# Patient Record
Sex: MALE | Race: NATIVE HAWAIIAN OR OTHER PACIFIC ISLANDER | URBAN - METROPOLITAN AREA
[De-identification: names, ages, dates, MRNs, and addresses within clinical notes are randomized per-mention and may not be internally consistent; named-entity substitution may affect disease eponyms.]

---

## 2017-08-30 ENCOUNTER — IMPORTED ENCOUNTER (OUTPATIENT)
Dept: URBAN - METROPOLITAN AREA CLINIC 50 | Facility: CLINIC | Age: 77
End: 2017-08-30

## 2017-09-05 ENCOUNTER — IMPORTED ENCOUNTER (OUTPATIENT)
Dept: URBAN - METROPOLITAN AREA CLINIC 50 | Facility: CLINIC | Age: 77
End: 2017-09-05

## 2018-03-06 ENCOUNTER — IMPORTED ENCOUNTER (OUTPATIENT)
Dept: URBAN - METROPOLITAN AREA CLINIC 50 | Facility: CLINIC | Age: 78
End: 2018-03-06

## 2018-09-04 ENCOUNTER — IMPORTED ENCOUNTER (OUTPATIENT)
Dept: URBAN - METROPOLITAN AREA CLINIC 50 | Facility: CLINIC | Age: 78
End: 2018-09-04

## 2019-03-05 ENCOUNTER — IMPORTED ENCOUNTER (OUTPATIENT)
Dept: URBAN - METROPOLITAN AREA CLINIC 50 | Facility: CLINIC | Age: 79
End: 2019-03-05

## 2019-09-24 ENCOUNTER — IMPORTED ENCOUNTER (OUTPATIENT)
Dept: URBAN - METROPOLITAN AREA CLINIC 50 | Facility: CLINIC | Age: 79
End: 2019-09-24

## 2021-04-18 ASSESSMENT — VISUAL ACUITY
OD_SC: 20/40
OS_SC: 20/25+2
OS_CC: J1
OD_SC: 20/30+2
OS_SC: 20/20-1
OS_SC: 20/25-
OD_CC: J1+
OS_CC: J1
OD_CC: J1
OD_PH: 20/25
OD_SC: 20/30
OS_SC: 20/25
OS_SC: 20/20
OS_CC: J1+
OD_CC: J1
OD_PH: 20/25-
OD_SC: 20/40
OD_SC: 20/25-1

## 2021-04-18 ASSESSMENT — TONOMETRY
OD_IOP_MMHG: 15
OS_IOP_MMHG: 16
OS_IOP_MMHG: 17
OS_IOP_MMHG: 16
OD_IOP_MMHG: 16
OD_IOP_MMHG: 17
OS_IOP_MMHG: 16
OS_IOP_MMHG: 15
OD_IOP_MMHG: 18
OS_IOP_MMHG: 15
OD_IOP_MMHG: 14
OS_IOP_MMHG: 17
OS_IOP_MMHG: 16
OS_IOP_MMHG: 17
OS_IOP_MMHG: 18

## 2021-04-18 ASSESSMENT — PACHYMETRY
OD_CT_UM: 541
OS_CT_UM: 534
OD_CT_UM: 541
OD_CT_UM: 541
OS_CT_UM: 534
OS_CT_UM: 534
OD_CT_UM: 541
OS_CT_UM: 534
OD_CT_UM: 541
OS_CT_UM: 534
OD_CT_UM: 541
OS_CT_UM: 534

## 2021-06-09 ENCOUNTER — PREPPED CHART (OUTPATIENT)
Dept: URBAN - METROPOLITAN AREA CLINIC 49 | Facility: CLINIC | Age: 81
End: 2021-06-09

## 2021-07-30 ENCOUNTER — APPOINTMENT (OUTPATIENT)
Dept: CT IMAGING | Facility: HOSPITAL | Age: 81
End: 2021-07-30
Payer: MEDICARE

## 2021-07-30 ENCOUNTER — HOSPITAL ENCOUNTER (EMERGENCY)
Facility: HOSPITAL | Age: 81
Discharge: HOME | End: 2021-07-30
Attending: FAMILY MEDICINE
Payer: MEDICARE

## 2021-07-30 ENCOUNTER — APPOINTMENT (OUTPATIENT)
Dept: RADIOLOGY | Facility: HOSPITAL | Age: 81
End: 2021-07-30
Payer: MEDICARE

## 2021-07-30 VITALS
RESPIRATION RATE: 24 BRPM | DIASTOLIC BLOOD PRESSURE: 65 MMHG | SYSTOLIC BLOOD PRESSURE: 138 MMHG | HEIGHT: 64 IN | OXYGEN SATURATION: 94 % | BODY MASS INDEX: 31.41 KG/M2 | WEIGHT: 184 LBS | HEART RATE: 67 BPM | TEMPERATURE: 98.1 F

## 2021-07-30 DIAGNOSIS — G45.9 TIA (TRANSIENT ISCHEMIC ATTACK): ICD-10-CM

## 2021-07-30 DIAGNOSIS — R51.9 HEADACHE: Primary | ICD-10-CM

## 2021-07-30 DIAGNOSIS — I16.0 HYPERTENSIVE URGENCY: ICD-10-CM

## 2021-07-30 LAB
ALBUMIN SERPL-MCNC: 4.3 G/DL (ref 3.5–5.3)
ALP SERPL-CCNC: 69 U/L (ref 45–115)
ALT SERPL-CCNC: 20 U/L (ref 7–52)
ANION GAP SERPL CALC-SCNC: 9 MMOL/L (ref 3–11)
AST SERPL-CCNC: 20 U/L
BASOPHILS # BLD AUTO: 0.1 10*3/UL
BASOPHILS NFR BLD AUTO: 1 % (ref 0–2)
BILIRUB SERPL-MCNC: 0.67 MG/DL (ref 0.2–1.4)
BUN SERPL-MCNC: 15 MG/DL (ref 7–25)
CALCIUM ALBUM COR SERPL-MCNC: 9.1 MG/DL (ref 8.6–10.3)
CALCIUM SERPL-MCNC: 9.3 MG/DL (ref 8.6–10.3)
CHLORIDE SERPL-SCNC: 105 MMOL/L (ref 98–107)
CO2 SERPL-SCNC: 21 MMOL/L (ref 21–32)
CREAT SERPL-MCNC: 0.81 MG/DL (ref 0.7–1.3)
EOSINOPHIL # BLD AUTO: 0.1 10*3/UL
EOSINOPHIL NFR BLD AUTO: 1 % (ref 0–3)
ERYTHROCYTE [DISTWIDTH] IN BLOOD BY AUTOMATED COUNT: 14.8 % (ref 11.5–15)
GFR SERPL CREATININE-BSD FRML MDRD: 85 ML/MIN/1.73M*2
GLUCOSE SERPL-MCNC: 101 MG/DL (ref 70–105)
HCT VFR BLD AUTO: 44.6 % (ref 38–50)
HGB BLD-MCNC: 15.4 G/DL (ref 13.2–17.2)
LEVETIRACETAM SERPL-MCNC: 19.4 UG/ML (ref 6–46)
LYMPHOCYTES # BLD AUTO: 1.7 10*3/UL
LYMPHOCYTES NFR BLD AUTO: 24 % (ref 15–47)
MAGNESIUM SERPL-MCNC: 2.1 MG/DL (ref 1.8–2.4)
MCH RBC QN AUTO: 32.3 PG (ref 29–34)
MCHC RBC AUTO-ENTMCNC: 34.6 G/DL (ref 32–36)
MCV RBC AUTO: 93.3 FL (ref 82–97)
MONOCYTES # BLD AUTO: 0.7 10*3/UL
MONOCYTES NFR BLD AUTO: 10 % (ref 5–13)
NEUTROPHILS # BLD AUTO: 4.7 10*3/UL
NEUTROPHILS NFR BLD AUTO: 65 % (ref 46–70)
PLATELET # BLD AUTO: 185 10*3/UL (ref 130–350)
PMV BLD AUTO: 8.8 FL (ref 6.9–10.8)
POTASSIUM SERPL-SCNC: 4.2 MMOL/L (ref 3.5–5.1)
PROT SERPL-MCNC: 6.9 G/DL (ref 6–8.3)
RBC # BLD AUTO: 4.78 10*6/ΜL (ref 4.1–5.8)
SODIUM SERPL-SCNC: 135 MMOL/L (ref 135–145)
WBC # BLD AUTO: 7.3 10*3/UL (ref 3.7–9.6)

## 2021-07-30 PROCEDURE — 85025 COMPLETE CBC W/AUTO DIFF WBC: CPT | Performed by: FAMILY MEDICINE

## 2021-07-30 PROCEDURE — 36415 COLL VENOUS BLD VENIPUNCTURE: CPT | Performed by: FAMILY MEDICINE

## 2021-07-30 PROCEDURE — 70450 CT HEAD/BRAIN W/O DYE: CPT | Mod: 26,MG | Performed by: RADIOLOGY

## 2021-07-30 PROCEDURE — 6370000100 HC RX 637 (ALT 250 FOR IP): Performed by: FAMILY MEDICINE

## 2021-07-30 PROCEDURE — 99284 EMERGENCY DEPT VISIT MOD MDM: CPT | Performed by: FAMILY MEDICINE

## 2021-07-30 PROCEDURE — 80053 COMPREHEN METABOLIC PANEL: CPT | Performed by: FAMILY MEDICINE

## 2021-07-30 PROCEDURE — 6360000200 HC RX 636 W HCPCS (ALT 250 FOR IP): Performed by: FAMILY MEDICINE

## 2021-07-30 PROCEDURE — 83735 ASSAY OF MAGNESIUM: CPT | Performed by: FAMILY MEDICINE

## 2021-07-30 PROCEDURE — 71046 X-RAY EXAM CHEST 2 VIEWS: CPT

## 2021-07-30 PROCEDURE — 96375 TX/PRO/DX INJ NEW DRUG ADDON: CPT

## 2021-07-30 PROCEDURE — 96374 THER/PROPH/DIAG INJ IV PUSH: CPT

## 2021-07-30 PROCEDURE — G1004 CDSM NDSC: HCPCS | Performed by: RADIOLOGY

## 2021-07-30 PROCEDURE — 80177 DRUG SCRN QUAN LEVETIRACETAM: CPT | Performed by: FAMILY MEDICINE

## 2021-07-30 PROCEDURE — 71046 X-RAY EXAM CHEST 2 VIEWS: CPT | Mod: 26,CMS | Performed by: RADIOLOGY

## 2021-07-30 PROCEDURE — G1004 CDSM NDSC: HCPCS

## 2021-07-30 RX ORDER — ATORVASTATIN CALCIUM 40 MG/1
40 TABLET, FILM COATED ORAL EVERY OTHER DAY
COMMUNITY

## 2021-07-30 RX ORDER — CLOPIDOGREL BISULFATE 75 MG/1
75 TABLET ORAL DAILY
COMMUNITY

## 2021-07-30 RX ORDER — AMLODIPINE BESYLATE 5 MG/1
5 TABLET ORAL DAILY
COMMUNITY

## 2021-07-30 RX ORDER — SODIUM CHLORIDE 9 MG/ML
25-50 INJECTION, SOLUTION INTRAVENOUS AS NEEDED
Status: DISCONTINUED | OUTPATIENT
Start: 2021-07-30 | End: 2021-07-30 | Stop reason: HOSPADM

## 2021-07-30 RX ORDER — METOPROLOL SUCCINATE 50 MG/1
75 TABLET, EXTENDED RELEASE ORAL NIGHTLY
COMMUNITY

## 2021-07-30 RX ORDER — HYDRALAZINE HYDROCHLORIDE 20 MG/ML
10 INJECTION INTRAMUSCULAR; INTRAVENOUS ONCE
Status: COMPLETED | OUTPATIENT
Start: 2021-07-30 | End: 2021-07-30

## 2021-07-30 RX ORDER — ESCITALOPRAM OXALATE 5 MG/1
10 TABLET ORAL EVERY MORNING
COMMUNITY

## 2021-07-30 RX ORDER — LEVETIRACETAM 500 MG/1
500 TABLET ORAL 2 TIMES DAILY
COMMUNITY

## 2021-07-30 RX ORDER — PANTOPRAZOLE SODIUM 40 MG/1
40 TABLET, DELAYED RELEASE ORAL DAILY
COMMUNITY

## 2021-07-30 RX ORDER — AMLODIPINE BESYLATE 5 MG/1
5 TABLET ORAL ONCE
Status: COMPLETED | OUTPATIENT
Start: 2021-07-30 | End: 2021-07-30

## 2021-07-30 RX ORDER — METOPROLOL SUCCINATE 50 MG/1
50 TABLET, EXTENDED RELEASE ORAL DAILY
COMMUNITY

## 2021-07-30 RX ORDER — ONDANSETRON HYDROCHLORIDE 2 MG/ML
4 INJECTION, SOLUTION INTRAVENOUS ONCE
Status: COMPLETED | OUTPATIENT
Start: 2021-07-30 | End: 2021-07-30

## 2021-07-30 RX ADMIN — AMLODIPINE BESYLATE 5 MG: 5 TABLET ORAL at 18:46

## 2021-07-30 RX ADMIN — HYDRALAZINE HYDROCHLORIDE 10 MG: 20 INJECTION INTRAMUSCULAR; INTRAVENOUS at 17:16

## 2021-07-30 RX ADMIN — ONDANSETRON 4 MG: 2 INJECTION INTRAMUSCULAR; INTRAVENOUS at 18:20

## 2021-07-30 ASSESSMENT — ENCOUNTER SYMPTOMS
SHORTNESS OF BREATH: 0
COLOR CHANGE: 0
SEIZURES: 0
HEADACHES: 1
CHILLS: 0
ARTHRALGIAS: 0
HEMATURIA: 0
ABDOMINAL PAIN: 0
PALPITATIONS: 0
VOMITING: 0
FEVER: 0
BACK PAIN: 0
SORE THROAT: 0
DYSURIA: 0
EYE PAIN: 0
COUGH: 0

## 2021-07-31 NOTE — ED PROVIDER NOTES
"    HPI:  Chief Complaint   Patient presents with   • Headache     Was reading this afternoon and the words on the page seemed to be jumbled and he could not understand them.  Also started to have a headache this afternoon and has had some increased BP lately.   • Blurred Vision       81-year-old male presents with family for an episode of confusion and blurred vision.  Is also complaining of a headache.  He was reading the paper and stated that the \"words were blurry\".  He also had an episode where he was unable to talk or explain himself.  He is complaining of a headache that he rates an 8 out of 10.  He does have a history of hypertension.  His wife states that his blood pressure sometimes \"goes haywire\".  He denies any injury or trauma.  He denies any fevers or chills.      History provided by:  Patient   used: No    Headache  Pain location:  Generalized  Quality:  Dull  Radiates to:  Does not radiate  Severity currently:  8/10  Severity at highest:  8/10  Onset quality:  Gradual  Duration:  3 hours  Timing:  Constant  Progression:  Waxing and waning  Chronicity:  New  Relieved by:  Nothing  Worsened by:  Nothing  Ineffective treatments:  None tried  Associated symptoms: no abdominal pain, no back pain, no cough, no ear pain, no eye pain, no fever, no seizures, no sore throat and no vomiting    Blurred Vision  Associated symptoms: headaches    Associated symptoms: no abdominal pain, no chest pain, no cough, no ear pain, no fever, no rash, no shortness of breath, no sore throat and no vomiting        HISTORY:  Past Medical History:   Diagnosis Date   • Coronary artery disease    • Depression    • Hypercholesteremia    • Hypertension    • Pancreatitis    • Seizure (CMS/HCC) ([facility])        Past Surgical History:   Procedure Laterality Date   • AORTIC VALVE REPLACEMENT  10/01/2020   • CARDIAC SURGERY     • CHOLECYSTECTOMY     • CORONARY ARTERY BYPASS GRAFT      double       History reviewed. No " pertinent family history.    Social History     Tobacco Use   • Smoking status: Never Smoker   • Smokeless tobacco: Never Used   Vaping Use   • Vaping Use: Never used   Substance Use Topics   • Alcohol use: Yes     Alcohol/week: 14.0 standard drinks     Types: 7 Glasses of wine, 7 Shots of liquor per week   • Drug use: Never         ROS:  Review of Systems   Constitutional: Negative for chills and fever.   HENT: Negative for ear pain and sore throat.    Eyes: Negative for pain and visual disturbance.   Respiratory: Negative for cough and shortness of breath.    Cardiovascular: Negative for chest pain and palpitations.   Gastrointestinal: Negative for abdominal pain and vomiting.   Genitourinary: Negative for dysuria and hematuria.   Musculoskeletal: Negative for arthralgias and back pain.   Skin: Negative for color change and rash.   Neurological: Positive for headaches. Negative for seizures and syncope.   All other systems reviewed and are negative.      PE:  ED Triage Vitals [07/30/21 1613]   Temp Heart Rate Resp BP SpO2   36.7 °C (98.1 °F) 84 16 (!) 185/93 96 %      Temp Source Heart Rate Source Patient Position BP Location FiO2 (%)   Oral -- -- -- --       Physical Exam  Vitals and nursing note reviewed.   Constitutional:       Appearance: He is well-developed.   HENT:      Head: Normocephalic and atraumatic.      Right Ear: Tympanic membrane normal.      Left Ear: Tympanic membrane normal.      Nose: Nose normal.      Mouth/Throat:      Mouth: Mucous membranes are moist.   Eyes:      Conjunctiva/sclera: Conjunctivae normal.      Pupils: Pupils are equal, round, and reactive to light.   Cardiovascular:      Rate and Rhythm: Normal rate and regular rhythm.      Heart sounds: Normal heart sounds. No murmur heard.     Pulmonary:      Effort: Pulmonary effort is normal. No respiratory distress.      Breath sounds: Normal breath sounds.   Abdominal:      General: Bowel sounds are normal.      Palpations: Abdomen is  soft.      Tenderness: There is no abdominal tenderness.   Musculoskeletal:         General: Normal range of motion.      Cervical back: Normal range of motion and neck supple.   Skin:     General: Skin is warm and dry.      Capillary Refill: Capillary refill takes less than 2 seconds.   Neurological:      Mental Status: He is alert and oriented to person, place, and time.      GCS: GCS eye subscore is 4. GCS verbal subscore is 5. GCS motor subscore is 6.      Cranial Nerves: Cranial nerves are intact.      Sensory: Sensation is intact.      Motor: Motor function is intact.      Coordination: Coordination is intact.      Gait: Gait is intact.      Deep Tendon Reflexes: Reflexes are normal and symmetric.         ED LABS:  Labs Reviewed   COMPREHENSIVE METABOLIC PANEL - Normal       Result Value    Sodium 135      Potassium 4.2      Chloride 105      CO2 21      Anion Gap 9      BUN 15      Creatinine 0.81      Glucose 101      Calcium 9.3      AST 20      ALT (SGPT) 20      Alkaline Phosphatase 69      Total Protein 6.9      Albumin 4.3      Total Bilirubin 0.67      eGFR 85      Corrected Calcium 9.1      Narrative:     Estimated GFR calculated using the 2009 CKD-EPI creatinine equation.   MAGNESIUM - Normal    Magnesium 2.1     LEVETIRACETAM LEVEL - Normal    Levetiracetam Lvl 19.4      Narrative:     Patients undergoing a switch in drug therapy involving Keppra and Briviact should not be monitored for levetiracetam. The circulating half-life of each drug is approximately 8 to 9 hours. Sufficient time should be allowed for clearance of the discontinued drug depending on the initial concentration prior to using this assay.   CBC WITH AUTO DIFFERENTIAL    WBC 7.3      RBC 4.78      Hemoglobin 15.4      Hematocrit 44.6      MCV 93.3      MCH 32.3      MCHC 34.6      RDW 14.8      Platelets 185      MPV 8.8      Neutrophils% 65      Lymphocytes% 24      Monocytes% 10      Eosinophils% 1      Basophils% 1      ANC (auto  "diff) 4.70      Lymphocytes Absolute 1.70      Monocytes Absolute 0.70      Eosinophils Absolute 0.10      Basophils Absolute 0.10           ED IMAGES:  X-ray chest 2 views   Final Result   IMPRESSION:   No acute cardiopulmonary process.         CT head without IV contrast   Final Result   IMPRESSION:    1. No acute intracranial abnormality.   2. Parenchymal atrophy and small vessel ischemic change.             ED PROCEDURES:  Procedures    ED COURSE:     81-year-old presents with headache and some confusion.  The patient is seen and examined.  Examination is detailed above but significant for a 81-year-old in no acute distress.  Blood pressure is elevated at 185 systolic.  Neuro exam is normal.  Patient is given hydralazine with improvement of his blood pressure.  Symptoms resolved as well as the headache.  He was given Zofran for nausea.  CT the head was negative for any intracranial pathology.  Chest x-ray was unremarkable.  Labs are unremarkable.  Blood pressure did start to increase slightly and he did have return of his headache.  He was given a dose of amlodipine with significant improvement of his blood pressure as well as his symptoms.  The patient stated he was feeling \"normal and actually quite well\".  He will be discharged home.  I recommend he increase his amlodipine to twice daily.  They are to continue his Plavix for stroke mitigation.  They are to follow-up with his primary physician and cardiologist when he returns home next week.  He will return to the ER as needed.     Sepsis Quality Bundle     MDM:  MDM  Number of Diagnoses or Management Options     Amount and/or Complexity of Data Reviewed  Clinical lab tests: ordered and reviewed  Tests in the radiology section of CPT®: ordered and reviewed    Risk of Complications, Morbidity, and/or Mortality  Presenting problems: moderate  Diagnostic procedures: moderate  Management options: moderate    Patient Progress  Patient progress: improved      Final " diagnoses:   [R51.9] Headache   [G45.9] TIA (transient ischemic attack)   [I16.0] Hypertensive urgency     7/30/2021  7:38 PM     Abhay Griffin MD  07/30/21 2008

## 2021-07-31 NOTE — DISCHARGE INSTRUCTIONS
Increase amlodipine to twice daily.  Continue other medications as prescribed.  Follow-up with cardiology and neurology when you return home.  Follow-up with primary as well.  Return to the ER with any other symptoms or signs of stroke such as weakness, numbness, difficulty speaking or vision loss.  Follow-up as needed.

## 2021-08-11 ENCOUNTER — COMPREHENSIVE EXAM (OUTPATIENT)
Dept: URBAN - METROPOLITAN AREA CLINIC 49 | Facility: CLINIC | Age: 81
End: 2021-08-11

## 2021-08-11 DIAGNOSIS — H35.373: ICD-10-CM

## 2021-08-11 DIAGNOSIS — H35.3131: ICD-10-CM

## 2021-08-11 DIAGNOSIS — H04.123: ICD-10-CM

## 2021-08-11 DIAGNOSIS — G43.101: ICD-10-CM

## 2021-08-11 PROCEDURE — 92014 COMPRE OPH EXAM EST PT 1/>: CPT

## 2021-08-11 PROCEDURE — 92134 CPTRZ OPH DX IMG PST SGM RTA: CPT

## 2021-08-11 ASSESSMENT — TONOMETRY
OS_IOP_MMHG: 17
OD_IOP_MMHG: 15
OS_IOP_MMHG: 16

## 2021-08-11 ASSESSMENT — VISUAL ACUITY
OS_SC: 20/20-1
OD_SC: 20/20-2

## 2021-08-11 NOTE — PATIENT DISCUSSION
Recent onset,  recent TIA.  Patient is followed by cardiology and is seeing them tomorrow.  Discussed with patient to continue follow ups with [facility] and cardiology.

## 2022-11-09 ENCOUNTER — COMPREHENSIVE EXAM (OUTPATIENT)
Dept: URBAN - METROPOLITAN AREA CLINIC 49 | Facility: CLINIC | Age: 82
End: 2022-11-09

## 2022-11-09 DIAGNOSIS — H40.89: ICD-10-CM

## 2022-11-09 DIAGNOSIS — H35.363: ICD-10-CM

## 2022-11-09 DIAGNOSIS — G43.101: ICD-10-CM

## 2022-11-09 DIAGNOSIS — H35.3131: ICD-10-CM

## 2022-11-09 PROCEDURE — 92134 CPTRZ OPH DX IMG PST SGM RTA: CPT

## 2022-11-09 PROCEDURE — 92014 COMPRE OPH EXAM EST PT 1/>: CPT

## 2022-11-09 ASSESSMENT — TONOMETRY
OS_IOP_MMHG: 14
OD_IOP_MMHG: 14
OS_IOP_MMHG: 15
OD_IOP_MMHG: 14

## 2022-11-09 ASSESSMENT — VISUAL ACUITY
OD_PH: 20/25
OS_SC: 20/25-2
OD_SC: 20/30-1
OU_SC: J1@17"

## 2022-11-09 NOTE — PATIENT DISCUSSION
The IOP is in the target range without the need for drop therapy. Schedule IOP check in 6 months with [facility]RN[facility] OCT.

## 2023-06-15 ENCOUNTER — FOLLOW UP (OUTPATIENT)
Dept: URBAN - METROPOLITAN AREA CLINIC 49 | Facility: CLINIC | Age: 83
End: 2023-06-15

## 2023-06-15 DIAGNOSIS — H40.89: ICD-10-CM

## 2023-06-15 PROCEDURE — 92012 INTRM OPH EXAM EST PATIENT: CPT

## 2023-06-15 PROCEDURE — 92133 CPTRZD OPH DX IMG PST SGM ON: CPT

## 2023-06-15 PROCEDURE — 92020 GONIOSCOPY: CPT

## 2023-06-15 PROCEDURE — 92083 EXTENDED VISUAL FIELD XM: CPT

## 2023-06-15 ASSESSMENT — VISUAL ACUITY
OD_SC: 20/30-1
OS_SC: 20/20-2
OD_PH: 20/25

## 2023-06-15 ASSESSMENT — TONOMETRY
OD_IOP_MMHG: 14
OD_IOP_MMHG: 14
OS_IOP_MMHG: 15
OS_IOP_MMHG: 14

## 2024-04-11 ENCOUNTER — COMPREHENSIVE EXAM (OUTPATIENT)
Dept: URBAN - METROPOLITAN AREA CLINIC 49 | Facility: LOCATION | Age: 84
End: 2024-04-11

## 2024-04-11 DIAGNOSIS — H04.123: ICD-10-CM

## 2024-04-11 DIAGNOSIS — H40.89: ICD-10-CM

## 2024-04-11 DIAGNOSIS — H35.3131: ICD-10-CM

## 2024-04-11 DIAGNOSIS — G43.101: ICD-10-CM

## 2024-04-11 PROCEDURE — 92134 CPTRZ OPH DX IMG PST SGM RTA: CPT

## 2024-04-11 PROCEDURE — 99214 OFFICE O/P EST MOD 30 MIN: CPT

## 2024-04-11 ASSESSMENT — TONOMETRY
OD_IOP_MMHG: 15
OS_IOP_MMHG: 18
OS_IOP_MMHG: 17

## 2024-04-11 ASSESSMENT — VISUAL ACUITY
OS_SC: 20/20
OU_SC: 20/20
OD_PH: 20/25
OU_SC: J1@12"
OD_SC: 20/30